# Patient Record
Sex: FEMALE | Race: WHITE | NOT HISPANIC OR LATINO | URBAN - METROPOLITAN AREA
[De-identification: names, ages, dates, MRNs, and addresses within clinical notes are randomized per-mention and may not be internally consistent; named-entity substitution may affect disease eponyms.]

---

## 2019-10-18 ENCOUNTER — OFFICE (OUTPATIENT)
Dept: URBAN - METROPOLITAN AREA CLINIC 45 | Facility: CLINIC | Age: 39
End: 2019-10-18

## 2019-10-18 VITALS
WEIGHT: 255 LBS | HEIGHT: 65 IN | HEART RATE: 89 BPM | SYSTOLIC BLOOD PRESSURE: 118 MMHG | DIASTOLIC BLOOD PRESSURE: 82 MMHG

## 2019-10-18 DIAGNOSIS — K44.9 GASTROESOPHAGEAL REFLUX DISEASE WITH HIATAL HERNIA: ICD-10-CM

## 2019-10-18 DIAGNOSIS — G47.30 SLEEP APNEA: ICD-10-CM

## 2019-10-18 DIAGNOSIS — K52.9: ICD-10-CM

## 2019-10-18 DIAGNOSIS — K76.0 NON-ALCOHOLIC FATTY LIVER: ICD-10-CM

## 2019-10-18 DIAGNOSIS — E66.01 OBESITY, MORBID: ICD-10-CM

## 2019-10-18 DIAGNOSIS — N97.9 FEMALE INFERTILITY: ICD-10-CM

## 2019-10-18 PROCEDURE — 99205 OFFICE O/P NEW HI 60 MIN: CPT | Performed by: INTERNAL MEDICINE

## 2019-10-18 NOTE — SERVICEHPINOTES
The patient presents for evaluation of prolonged diarrhea. Patient has been having 3-5 loose BMs a day over the last 6 months. She has had prior periods of diarrhea as well in the past. Symptoms usually awaken her in the morning, happening during the daytime, at times at night.  She has seen small amount of bright red blood around the stools, which at times are dark-colored as well. She is experiencing frequent abdominal cramping and pain as well as bowel urgency. Patient was previously seen by Dr. Shaun Jones in November 2016 for lower abdominal pain. This was preceded by a brief evaluation at [facility]. CT scan at the time showed a diffuse fatty liver, suspected ovarian cysts. Patient was subsequently seen by gynecologist who felt that symptoms were not gynecological, however, eventually underwent laparoscopic oophorectomy in 2018. The pains have basically resolved since. There has been no previous colon screening, although she was recommended to undergo EGD and colonoscopy in 2016. The patient has no family history of colon cancer or other significant GI diseases. Patient denies nausea, vomiting, dysphagia, constipation, melena. Patient has gained significant amount of weight over the last few years. She has been undergoing IVF for infertility. Denies shortness of breath and chest pain.

## 2019-10-22 LAB
C-REACTIVE PROTEIN: 8.6 MG/L — HIGH (ref ?–8)
CBC (INCLUDES DIFF/PLT): ABSOLUTE BAND NEUTROPHILS: NORMAL CELLS/UL
CBC (INCLUDES DIFF/PLT): ABSOLUTE BASOPHILS: 35 CELLS/UL (ref 0–200)
CBC (INCLUDES DIFF/PLT): ABSOLUTE BLASTS: NORMAL CELLS/UL
CBC (INCLUDES DIFF/PLT): ABSOLUTE EOSINOPHILS: 104 CELLS/UL (ref 15–500)
CBC (INCLUDES DIFF/PLT): ABSOLUTE LYMPHOCYTES: 3399 CELLS/UL (ref 850–3900)
CBC (INCLUDES DIFF/PLT): ABSOLUTE METAMYELOCYTES: NORMAL CELLS/UL
CBC (INCLUDES DIFF/PLT): ABSOLUTE MONOCYTES: 696 CELLS/UL (ref 200–950)
CBC (INCLUDES DIFF/PLT): ABSOLUTE MYELOCYTES: NORMAL CELLS/UL
CBC (INCLUDES DIFF/PLT): ABSOLUTE NEUTROPHILS: 7366 CELLS/UL (ref 1500–7800)
CBC (INCLUDES DIFF/PLT): ABSOLUTE NUCLEATED RBC: 0 CELLS/UL (ref 0–?)
CBC (INCLUDES DIFF/PLT): ABSOLUTE PROMYELOCYTES: NORMAL CELLS/UL
CBC (INCLUDES DIFF/PLT): BAND NEUTROPHILS: NORMAL %
CBC (INCLUDES DIFF/PLT): BASOPHILS: 0.3 %
CBC (INCLUDES DIFF/PLT): BLASTS: NORMAL %
CBC (INCLUDES DIFF/PLT): COMMENT(S): NORMAL
CBC (INCLUDES DIFF/PLT): EOSINOPHILS: 0.9 %
CBC (INCLUDES DIFF/PLT): HEMATOCRIT: 35.8 % (ref 35–45)
CBC (INCLUDES DIFF/PLT): HEMOGLOBIN: 12 G/DL (ref 11.7–15.5)
CBC (INCLUDES DIFF/PLT): LYMPHOCYTES: 29.3 %
CBC (INCLUDES DIFF/PLT): MCH: 28.8 PG (ref 27–33)
CBC (INCLUDES DIFF/PLT): MCHC: 33.5 G/DL (ref 32–36)
CBC (INCLUDES DIFF/PLT): MCV: 86.1 FL (ref 80–100)
CBC (INCLUDES DIFF/PLT): METAMYELOCYTES: NORMAL %
CBC (INCLUDES DIFF/PLT): MONOCYTES: 6 %
CBC (INCLUDES DIFF/PLT): MPV: 10.6 FL (ref 7.5–12.5)
CBC (INCLUDES DIFF/PLT): MYELOCYTES: NORMAL %
CBC (INCLUDES DIFF/PLT): NEUTROPHILS: 63.5 %
CBC (INCLUDES DIFF/PLT): NUCLEATED RBC: NORMAL /100 WBC
CBC (INCLUDES DIFF/PLT): PLATELET COUNT: 402 THOUSAND/UL — HIGH (ref 140–400)
CBC (INCLUDES DIFF/PLT): PROMYELOCYTES: NORMAL %
CBC (INCLUDES DIFF/PLT): RDW: 13.4 % (ref 11–15)
CBC (INCLUDES DIFF/PLT): REACTIVE LYMPHOCYTES: NORMAL %
CBC (INCLUDES DIFF/PLT): RED BLOOD CELL COUNT: 4.16 MILLION/UL (ref 3.8–5.1)
CBC (INCLUDES DIFF/PLT): WHITE BLOOD CELL COUNT: 11.6 THOUSAND/UL — HIGH (ref 3.8–10.8)
CELIAC DISEASE COMPREHENSIVE PANEL: IMMUNOGLOBULIN A: 96 MG/DL (ref 47–310)
CELIAC DISEASE COMPREHENSIVE PANEL: INTERPRETATION: (no result)
CELIAC DISEASE COMPREHENSIVE PANEL: TISSUE TRANSGLUTAMINASE AB, IGA: <1 U/ML
COMPREHENSIVE METABOLIC PANEL: ALBUMIN/GLOBULIN RATIO: 1.3 (CALC) (ref 1–2.5)
COMPREHENSIVE METABOLIC PANEL: ALBUMIN: 3.7 G/DL (ref 3.6–5.1)
COMPREHENSIVE METABOLIC PANEL: ALKALINE PHOSPHATASE: 80 U/L (ref 33–115)
COMPREHENSIVE METABOLIC PANEL: ALT: 19 U/L (ref 6–29)
COMPREHENSIVE METABOLIC PANEL: AST: 13 U/L (ref 10–30)
COMPREHENSIVE METABOLIC PANEL: BILIRUBIN, TOTAL: 0.2 MG/DL (ref 0.2–1.2)
COMPREHENSIVE METABOLIC PANEL: BUN/CREATININE RATIO: (no result) (CALC)
COMPREHENSIVE METABOLIC PANEL: CALCIUM: 8.9 MG/DL (ref 8.6–10.2)
COMPREHENSIVE METABOLIC PANEL: CARBON DIOXIDE: 23 MMOL/L (ref 20–32)
COMPREHENSIVE METABOLIC PANEL: CHLORIDE: 105 MMOL/L (ref 98–110)
COMPREHENSIVE METABOLIC PANEL: CREATININE: 0.7 MG/DL (ref 0.5–1.1)
COMPREHENSIVE METABOLIC PANEL: EGFR AFRICAN AMERICAN: 128 ML/MIN/1.73M2 (ref 60–?)
COMPREHENSIVE METABOLIC PANEL: EGFR NON-AFR. AMERICAN: 111 ML/MIN/1.73M2 (ref 60–?)
COMPREHENSIVE METABOLIC PANEL: GLOBULIN: 2.9 G/DL (CALC) (ref 1.9–3.7)
COMPREHENSIVE METABOLIC PANEL: GLUCOSE: 116 MG/DL (ref 65–139)
COMPREHENSIVE METABOLIC PANEL: POTASSIUM: 4.1 MMOL/L (ref 3.5–5.3)
COMPREHENSIVE METABOLIC PANEL: PROTEIN, TOTAL: 6.6 G/DL (ref 6.1–8.1)
COMPREHENSIVE METABOLIC PANEL: SODIUM: 138 MMOL/L (ref 135–146)
COMPREHENSIVE METABOLIC PANEL: UREA NITROGEN (BUN): 12 MG/DL (ref 7–25)
FERRITIN: 103 NG/ML (ref 16–154)
IRON AND TOTAL IRON BINDING CAPACITY: % SATURATION: 15 % (CALC) — LOW (ref 16–45)
IRON AND TOTAL IRON BINDING CAPACITY: IRON BINDING CAPACITY: 310 MCG/DL (CALC) (ref 250–450)
IRON AND TOTAL IRON BINDING CAPACITY: IRON, TOTAL: 46 MCG/DL (ref 40–190)

## 2020-06-29 ENCOUNTER — OFFICE (OUTPATIENT)
Dept: URBAN - METROPOLITAN AREA CLINIC 45 | Facility: CLINIC | Age: 40
End: 2020-06-29

## 2020-06-29 VITALS — HEIGHT: 65 IN | WEIGHT: 255 LBS

## 2020-06-29 DIAGNOSIS — E66.01 OBESITY, MORBID: ICD-10-CM

## 2020-06-29 DIAGNOSIS — K52.9: ICD-10-CM

## 2020-06-29 DIAGNOSIS — K91.5 POSTCHOLECYSTECTOMY STATE: ICD-10-CM

## 2020-06-29 DIAGNOSIS — K44.9: ICD-10-CM

## 2020-06-29 DIAGNOSIS — N80.9 ENDOMETRIOSIS (CLINICAL): ICD-10-CM

## 2020-06-29 DIAGNOSIS — K76.0 NON-ALCOHOLIC FATTY LIVER: ICD-10-CM

## 2020-06-29 PROCEDURE — G0406 INPT/TELE FOLLOW UP 15: HCPCS | Performed by: INTERNAL MEDICINE

## 2020-06-29 PROCEDURE — 99214 OFFICE O/P EST MOD 30 MIN: CPT | Performed by: INTERNAL MEDICINE

## 2020-06-29 RX ORDER — DICYCLOMINE HYDROCHLORIDE 20 MG/1
60 TABLET ORAL
Qty: 60 | Status: COMPLETED
Start: 2020-06-29 | End: 2021-11-04

## 2020-06-29 NOTE — SERVICEHPINOTES
The patient returns for telemedicine follow-up recent exacerbation of diarrhea and abdominal cramping. Patient has been experiencing abdominal cramping and frequent loose BMs a day over the couple weeks. She has previously experienced bouts of similar symptoms over the last couple of years, for which she reportedly had negative stool studies and lab work. Symptoms usually awaken her in the morning, happening during the daytime, occasionally at night. She denies seeing blood except around the stools, which at times are dark-colored as well. She is experiencing frequent abdominal cramping and pain as well as bowel urgency. Patient underwent laparoscopic cholecystectomy about 6 months ago for acute cholecystitis. Prior abdominal CT scan done to evaluate recurrence of abdominal pain and cramping several years ago showed a diffuse fatty liver, suspected ovarian cysts. Patient was subsequently underwent laparoscopic oophorectomy in 2018 due to suspected endometriosis. The patient has no family history of colon cancer or other significant GI diseases. Patient denies recent fever or chills, nausea, vomiting, dysphagia, constipation, melena. Patient has gained significant amount of weight over the last several years, although lost 6-7 pounds in the last couple weeks due to diarrhea and decreased oral intake. She has been complaining on harvesting her eggs for planned IVF by her fertility specialist GYN.

## 2021-04-21 ENCOUNTER — APPOINTMENT (RX ONLY)
Dept: URBAN - METROPOLITAN AREA CLINIC 325 | Facility: CLINIC | Age: 41
Setting detail: DERMATOLOGY
End: 2021-04-21

## 2021-04-21 DIAGNOSIS — L81.0 POSTINFLAMMATORY HYPERPIGMENTATION: ICD-10-CM | Status: STABLE

## 2021-04-21 DIAGNOSIS — L81.4 OTHER MELANIN HYPERPIGMENTATION: ICD-10-CM | Status: STABLE

## 2021-04-21 DIAGNOSIS — L29.8 OTHER PRURITUS: ICD-10-CM | Status: STABLE

## 2021-04-21 DIAGNOSIS — R21 RASH AND OTHER NONSPECIFIC SKIN ERUPTION: ICD-10-CM | Status: WORSENING

## 2021-04-21 DIAGNOSIS — L29.89 OTHER PRURITUS: ICD-10-CM | Status: STABLE

## 2021-04-21 PROCEDURE — 99203 OFFICE O/P NEW LOW 30 MIN: CPT

## 2021-04-21 PROCEDURE — ? TREATMENT REGIMEN

## 2021-04-21 PROCEDURE — ? PRESCRIPTION

## 2021-04-21 PROCEDURE — ? SUNSCREEN RECOMMENDATIONS

## 2021-04-21 PROCEDURE — ? COUNSELING

## 2021-04-21 RX ORDER — PIMECROLIMUS 10 MG/G
CREAM TOPICAL BID
Qty: 1 | Refills: 0 | Status: ERX | COMMUNITY
Start: 2021-04-21

## 2021-04-21 RX ADMIN — PIMECROLIMUS: 10 CREAM TOPICAL at 00:00

## 2021-04-21 ASSESSMENT — LOCATION SIMPLE DESCRIPTION DERM
LOCATION SIMPLE: LEFT INFERIOR EYELID
LOCATION SIMPLE: LEFT CHEEK

## 2021-04-21 ASSESSMENT — LOCATION DETAILED DESCRIPTION DERM
LOCATION DETAILED: LEFT SUPERIOR CENTRAL MALAR CHEEK
LOCATION DETAILED: LEFT LATERAL INFERIOR EYELID

## 2021-04-21 ASSESSMENT — LOCATION ZONE DERM
LOCATION ZONE: EYELID
LOCATION ZONE: FACE

## 2021-04-21 NOTE — HPI: SKIN LESIONS
How Severe Is Your Skin Lesion?: mild
Have Your Skin Lesions Been Treated?: not been treated
Is This A New Presentation, Or A Follow-Up?: Skin Lesions
Additional History: TEMP 96.8

## 2021-04-21 NOTE — PROCEDURE: MIPS QUALITY
Quality 226: Preventive Care And Screening: Tobacco Use: Screening And Cessation Intervention: Patient screened for tobacco use and is an ex/non-smoker
Quality 337: Tuberculosis Prevention For Psoriasis And Psoriatic Arthritis Patients On A Biological Immune Response Modifier: No documentation of negative or managed positive TB screen
Quality 137: Melanoma: Continuity Of Care - Recall System: Recall system not utilized, reason not otherwise specified
Quality 130: Documentation Of Current Medications In The Medical Record: Current Medications Documented
Detail Level: Detailed
Quality 402: Tobacco Use And Help With Quitting Among Adolescents: Patient screened for tobacco and is an ex-smoker
Quality 410: Psoriasis Clinical Response To Oral Systemic Or Biologic Mediations: Psoriasis Assessment Tool NOT Documented
Quality 431: Preventive Care And Screening: Unhealthy Alcohol Use - Screening: Unhealthy alcohol use screening not performed, reason not otherwise specified
Quality 47: Advance Care Plan: Advance care planning not documented, reason not otherwise specified.
Quality 138: Melanoma: Coordination Of Care: The patient does not have a PCP or referring physician.

## 2021-11-04 ENCOUNTER — OFFICE (OUTPATIENT)
Dept: URBAN - METROPOLITAN AREA CLINIC 45 | Facility: CLINIC | Age: 41
End: 2021-11-04

## 2021-11-04 VITALS
SYSTOLIC BLOOD PRESSURE: 118 MMHG | TEMPERATURE: 96.3 F | HEART RATE: 71 BPM | DIASTOLIC BLOOD PRESSURE: 82 MMHG | WEIGHT: 279 LBS | HEIGHT: 65 IN

## 2021-11-04 DIAGNOSIS — K44.9: ICD-10-CM

## 2021-11-04 DIAGNOSIS — E66.01 OBESITY, MORBID: ICD-10-CM

## 2021-11-04 DIAGNOSIS — K91.5 POSTCHOLECYSTECTOMY STATE: ICD-10-CM

## 2021-11-04 DIAGNOSIS — N80.9 ENDOMETRIOSIS (CLINICAL): ICD-10-CM

## 2021-11-04 DIAGNOSIS — K52.9: ICD-10-CM

## 2021-11-04 DIAGNOSIS — K76.0 NON-ALCOHOLIC FATTY LIVER: ICD-10-CM

## 2021-11-04 DIAGNOSIS — N97.9 FEMALE INFERTILITY: ICD-10-CM

## 2021-11-04 PROCEDURE — 99214 OFFICE O/P EST MOD 30 MIN: CPT | Performed by: INTERNAL MEDICINE

## 2021-11-04 NOTE — SERVICEHPINOTES
The patient returns for office evaluation of recent exacerbation of altered bowel habits-diarrhea and abdominal cramping. She reports having frequent unformed or loose BMs a day over the last several months.  Patient has previously experienced bouts of similar symptoms over the last several years, suggestive of IBS D. She had prior unremarkable stool studies and lab work. Symptoms did not improve after prolonged trial of fiber, probiotics, Imodium and anti-spasmodics. Patient has been actively trying to get pregnant having prolonged issues with infertility. She denies nocturnal awakening with bowel urgency, hematochezia or melena. Patient has recently gained almost 20 pounds [place] [facility]. Patient previously had laparoscopic cholecystectomy for acute cholecystitis. Both recent and prior abdominal CT scans showed diffuse fatty liver, postcholecystectomy state. Patient also previously had laparoscopic oophorectomy in 2018 due to suspected endometriosis. Patient denies recent fever, nausea, vomiting, dysphagia, constipation, melena.